# Patient Record
Sex: MALE | URBAN - METROPOLITAN AREA
[De-identification: names, ages, dates, MRNs, and addresses within clinical notes are randomized per-mention and may not be internally consistent; named-entity substitution may affect disease eponyms.]

---

## 2020-08-27 ENCOUNTER — PREP FOR PROCEDURE (OUTPATIENT)
Dept: OPHTHALMOLOGY | Facility: CLINIC | Age: 70
End: 2020-08-27

## 2020-08-27 DIAGNOSIS — H25.13 AGE-RELATED NUCLEAR CATARACT OF BOTH EYES: Primary | ICD-10-CM

## 2020-09-16 ENCOUNTER — HOSPITAL ENCOUNTER (OUTPATIENT)
Facility: AMBULATORY SURGERY CENTER | Age: 70
End: 2020-09-16
Attending: OPHTHALMOLOGY
Payer: COMMERCIAL

## 2020-09-16 DIAGNOSIS — H25.13 AGE-RELATED NUCLEAR CATARACT OF BOTH EYES: ICD-10-CM

## 2020-09-16 DIAGNOSIS — Z11.59 ENCOUNTER FOR SCREENING FOR OTHER VIRAL DISEASES: Primary | ICD-10-CM

## 2021-04-25 DIAGNOSIS — H25.13 AGE-RELATED NUCLEAR CATARACT OF BOTH EYES: Primary | ICD-10-CM

## 2021-04-27 PROBLEM — H25.13 AGE-RELATED NUCLEAR CATARACT OF BOTH EYES: Status: ACTIVE | Noted: 2020-09-16

## 2021-05-16 DIAGNOSIS — Z11.59 ENCOUNTER FOR SCREENING FOR OTHER VIRAL DISEASES: Primary | ICD-10-CM

## 2021-05-24 DIAGNOSIS — Z11.59 ENCOUNTER FOR SCREENING FOR OTHER VIRAL DISEASES: ICD-10-CM

## 2021-06-09 ENCOUNTER — OFFICE VISIT (OUTPATIENT)
Dept: OPHTHALMOLOGY | Facility: CLINIC | Age: 71
End: 2021-06-09
Attending: OPHTHALMOLOGY
Payer: COMMERCIAL

## 2021-06-09 ENCOUNTER — TELEPHONE (OUTPATIENT)
Dept: OPHTHALMOLOGY | Facility: CLINIC | Age: 71
End: 2021-06-09

## 2021-06-09 DIAGNOSIS — H25.13 AGE-RELATED NUCLEAR CATARACT OF BOTH EYES: Primary | ICD-10-CM

## 2021-06-09 DIAGNOSIS — Z11.59 ENCOUNTER FOR SCREENING FOR OTHER VIRAL DISEASES: ICD-10-CM

## 2021-06-09 LAB
LABORATORY COMMENT REPORT: NORMAL
SARS-COV-2 RNA RESP QL NAA+PROBE: NEGATIVE
SARS-COV-2 RNA RESP QL NAA+PROBE: NORMAL
SPECIMEN SOURCE: NORMAL
SPECIMEN SOURCE: NORMAL

## 2021-06-09 PROCEDURE — U0005 INFEC AGEN DETEC AMPLI PROBE: HCPCS | Mod: 90 | Performed by: PATHOLOGY

## 2021-06-09 PROCEDURE — G0463 HOSPITAL OUTPT CLINIC VISIT: HCPCS | Mod: 25

## 2021-06-09 PROCEDURE — 99204 OFFICE O/P NEW MOD 45 MIN: CPT | Mod: GC | Performed by: OPHTHALMOLOGY

## 2021-06-09 PROCEDURE — U0003 INFECTIOUS AGENT DETECTION BY NUCLEIC ACID (DNA OR RNA); SEVERE ACUTE RESPIRATORY SYNDROME CORONAVIRUS 2 (SARS-COV-2) (CORONAVIRUS DISEASE [COVID-19]), AMPLIFIED PROBE TECHNIQUE, MAKING USE OF HIGH THROUGHPUT TECHNOLOGIES AS DESCRIBED BY CMS-2020-01-R: HCPCS | Mod: 90 | Performed by: PATHOLOGY

## 2021-06-09 RX ORDER — PREDNISOLONE ACETATE 10 MG/ML
1 SUSPENSION/ DROPS OPHTHALMIC 4 TIMES DAILY
Qty: 10 ML | Refills: 0 | Status: SHIPPED | OUTPATIENT
Start: 2021-06-09

## 2021-06-09 RX ORDER — OFLOXACIN 3 MG/ML
1 SOLUTION/ DROPS OPHTHALMIC 4 TIMES DAILY
Qty: 5 ML | Refills: 0 | Status: SHIPPED | OUTPATIENT
Start: 2021-06-09

## 2021-06-09 RX ORDER — OFLOXACIN 3 MG/ML
1 SOLUTION/ DROPS OPHTHALMIC 4 TIMES DAILY
Qty: 5 ML | Refills: 0 | Status: SHIPPED | OUTPATIENT
Start: 2021-06-09 | End: 2021-06-09

## 2021-06-09 RX ORDER — FINASTERIDE 5 MG/1
5 TABLET, FILM COATED ORAL DAILY
COMMUNITY
Start: 2021-05-18

## 2021-06-09 RX ORDER — KETOROLAC TROMETHAMINE 5 MG/ML
1 SOLUTION OPHTHALMIC 4 TIMES DAILY
Qty: 5 ML | Refills: 0 | Status: SHIPPED | OUTPATIENT
Start: 2021-06-09 | End: 2021-06-09

## 2021-06-09 RX ORDER — KETOROLAC TROMETHAMINE 5 MG/ML
1 SOLUTION OPHTHALMIC 4 TIMES DAILY
Qty: 5 ML | Refills: 0 | Status: SHIPPED | OUTPATIENT
Start: 2021-06-09

## 2021-06-09 RX ORDER — PREDNISOLONE ACETATE 10 MG/ML
1 SUSPENSION/ DROPS OPHTHALMIC 4 TIMES DAILY
Qty: 10 ML | Refills: 0 | Status: SHIPPED | OUTPATIENT
Start: 2021-06-09 | End: 2021-06-09

## 2021-06-09 ASSESSMENT — REFRACTION_WEARINGRX
OD_AXIS: 008
OD_CYLINDER: +0.75
OD_ADD: +2.50
OS_ADD: +2.50
OD_SPHERE: -7.50
SPECS_TYPE: PAL
OS_AXIS: 150
OS_SPHERE: -7.50
OS_CYLINDER: +1.00

## 2021-06-09 ASSESSMENT — VISUAL ACUITY
OD_CC: 20/60
OS_PH_CC: 20/40
CORRECTION_TYPE: GLASSES
OS_PH_CC+: -2
METHOD: SNELLEN - LINEAR
OS_CC+: +1
OS_CC: 20/50

## 2021-06-09 ASSESSMENT — CONF VISUAL FIELD
OS_NORMAL: 1
OD_NORMAL: 1

## 2021-06-09 ASSESSMENT — TONOMETRY
IOP_METHOD: ICARE
OS_IOP_MMHG: 12
OD_IOP_MMHG: 11

## 2021-06-09 ASSESSMENT — SLIT LAMP EXAM - LIDS
COMMENTS: NORMAL
COMMENTS: NORMAL

## 2021-06-09 NOTE — TELEPHONE ENCOUNTER
[facility] Prior Authorization Team Request    Medication: Ketorolac 0.5% Solution  Dosing: Place one drop into the right eye 4 times daily  Qty: 5  Day Supply: 25  NDC (required for Medicaid members): [address]-06     Insurance   BIN: [id number]  PCN: [id number]  Grp: [id number]  ID: [id number]    CoverMyMeds Key (if applicable):     Additional documentation: Qty Limits PA required. Max daily dose per plan of 0.17 ml daily.      Filling Pharmacy: [facility]  Phone Number: [phone]  Contact:  Tyree Sanders NPI (required for Medicaid members): [id number]

## 2021-06-09 NOTE — PROGRESS NOTES
CC:  Here for Cataract eval each eye.    HPI:    Toño Brown 71 year old  male here for cataract evaluation. Endorses mild dryness, itching. No diplopia, floaters or flashing lights. Vision has progressively worsened and it is worse at night    POHx:  Last eye exam: 3yrs ?  Prior eye surgery/laser: NO  CTL wearer: NO  Glasses: yes progressive?)    Family Hx of eye disease: NK at this time    Gtts Currenly Taking:  PFATS BE    Allergies:  NKDA    A/P:    # Senile Nuclear Cataracts each eye  - Biometry (Immersion today)  - DAVION today  - H&P  completed today in clinc.  - On Finesteride    Visually significant cataract, both eye  - R/B/A/possible complications of cataract surgery reviewed and patient would like to proceed with CEIOL  - No trauma,on Finesteride, no PXF material  - No fluoroquinolone allergy  - Target: Near (-2.25D to -2.50D)  - Anesthesia: Mac/topical    # Dry Eye Syndrome each eye  - using PFATS x3-4/day each eye    #myopia, OU    Follow up Cornea: IN OR on 6/10/21 RE & 6/15/21 DANYELLE, Dr Brown.      Desmond Yan MD  Ophthalmology Resident, PGY-3     Attending Physician Attestation:  Complete documentation of historical and exam elements from today's encounter can be found in the full encounter summary report (not reduplicated in this progress note).  I personally obtained the chief complaint(s) and history of present illness.  I confirmed and edited as necessary the review of systems, past medical/surgical history, family history, social history, and examination findings as documented by others; and I examined the patient myself.  I personally reviewed the relevant tests, images, and reports as documented above.  I formulated and edited as necessary the assessment and plan and discussed the findings and management plan with the patient and family. - Callum Brown MD

## 2021-06-09 NOTE — TELEPHONE ENCOUNTER
PA Initiation    Medication: ketorolac (ACULAR) 0.5 % ophthalmic solution   Insurance Company: Other (see comments)  Pharmacy Filling the Rx: [facility] [place], [place] - [address] [place] [address] [phone]  Filling Pharmacy Phone: [phone]  Filling Pharmacy Fax:    Start Date: 6/9/2021

## 2021-06-09 NOTE — NURSING NOTE
Chief Complaints and History of Present Illnesses   Patient presents with     Cataract Evaluation     Chief Complaint(s) and History of Present Illness(es)     Cataract Evaluation     Laterality: both eyes    Associated symptoms: blurred vision, glare and haloes    Treatments tried: glasses and artificial tears    Pain scale: 0/10              Comments     Pt takes glasses off to read - notes blurriness that has worsened over the past year or so  Right eye CE/IOL set for 6/10/21 and left eye 6/15/21  Pt does note occasional floaters - denies flashes  Pt uses AT's at bedtime both eyes    TO Terrazas 8:12 AM June 9, 2021

## 2021-06-10 ENCOUNTER — ANESTHESIA (OUTPATIENT)
Dept: SURGERY | Facility: AMBULATORY SURGERY CENTER | Age: 71
End: 2021-06-10
Payer: COMMERCIAL

## 2021-06-10 ENCOUNTER — HOSPITAL ENCOUNTER (OUTPATIENT)
Facility: AMBULATORY SURGERY CENTER | Age: 71
End: 2021-06-10
Attending: OPHTHALMOLOGY
Payer: COMMERCIAL

## 2021-06-10 ENCOUNTER — ANESTHESIA EVENT (OUTPATIENT)
Dept: SURGERY | Facility: AMBULATORY SURGERY CENTER | Age: 71
End: 2021-06-10
Payer: COMMERCIAL

## 2021-06-10 VITALS
HEIGHT: 65 IN | HEART RATE: 64 BPM | DIASTOLIC BLOOD PRESSURE: 73 MMHG | BODY MASS INDEX: 28.66 KG/M2 | OXYGEN SATURATION: 98 % | WEIGHT: 172 LBS | SYSTOLIC BLOOD PRESSURE: 111 MMHG | RESPIRATION RATE: 16 BRPM | TEMPERATURE: 98 F

## 2021-06-10 DIAGNOSIS — H25.13 AGE-RELATED NUCLEAR CATARACT OF BOTH EYES: Primary | ICD-10-CM

## 2021-06-10 PROCEDURE — 66984 XCAPSL CTRC RMVL W/O ECP: CPT | Mod: RT

## 2021-06-10 DEVICE — EYE IMP IOL AMO PCL TECNIS ZCB00 13.5: Type: IMPLANTABLE DEVICE | Site: EYE | Status: FUNCTIONAL

## 2021-06-10 RX ORDER — FENTANYL CITRATE 50 UG/ML
INJECTION, SOLUTION INTRAMUSCULAR; INTRAVENOUS PRN
Status: DISCONTINUED | OUTPATIENT
Start: 2021-06-10 | End: 2021-06-10

## 2021-06-10 RX ORDER — SODIUM CHLORIDE, SODIUM LACTATE, POTASSIUM CHLORIDE, CALCIUM CHLORIDE 600; 310; 30; 20 MG/100ML; MG/100ML; MG/100ML; MG/100ML
INJECTION, SOLUTION INTRAVENOUS CONTINUOUS
Status: DISCONTINUED | OUTPATIENT
Start: 2021-06-10 | End: 2021-06-11 | Stop reason: HOSPADM

## 2021-06-10 RX ORDER — GLYCOPYRROLATE 0.2 MG/ML
INJECTION, SOLUTION INTRAMUSCULAR; INTRAVENOUS PRN
Status: DISCONTINUED | OUTPATIENT
Start: 2021-06-10 | End: 2021-06-10

## 2021-06-10 RX ORDER — MOXIFLOXACIN 5 MG/ML
1 SOLUTION/ DROPS OPHTHALMIC
Status: COMPLETED | OUTPATIENT
Start: 2021-06-10 | End: 2021-06-10

## 2021-06-10 RX ORDER — TETRACAINE HYDROCHLORIDE 5 MG/ML
SOLUTION OPHTHALMIC PRN
Status: DISCONTINUED | OUTPATIENT
Start: 2021-06-10 | End: 2021-06-10 | Stop reason: HOSPADM

## 2021-06-10 RX ORDER — PROPARACAINE HYDROCHLORIDE 5 MG/ML
1 SOLUTION/ DROPS OPHTHALMIC ONCE
Status: COMPLETED | OUTPATIENT
Start: 2021-06-10 | End: 2021-06-10

## 2021-06-10 RX ORDER — LIDOCAINE 40 MG/G
CREAM TOPICAL
Status: DISCONTINUED | OUTPATIENT
Start: 2021-06-10 | End: 2021-06-10 | Stop reason: HOSPADM

## 2021-06-10 RX ORDER — ONDANSETRON 2 MG/ML
INJECTION INTRAMUSCULAR; INTRAVENOUS PRN
Status: DISCONTINUED | OUTPATIENT
Start: 2021-06-10 | End: 2021-06-10

## 2021-06-10 RX ORDER — MOXIFLOXACIN IN NACL,ISO-OS/PF 0.3MG/0.3
SYRINGE (ML) INTRAOCULAR PRN
Status: DISCONTINUED | OUTPATIENT
Start: 2021-06-10 | End: 2021-06-10 | Stop reason: HOSPADM

## 2021-06-10 RX ORDER — LIDOCAINE HYDROCHLORIDE 10 MG/ML
INJECTION, SOLUTION EPIDURAL; INFILTRATION; INTRACAUDAL; PERINEURAL PRN
Status: DISCONTINUED | OUTPATIENT
Start: 2021-06-10 | End: 2021-06-10 | Stop reason: HOSPADM

## 2021-06-10 RX ORDER — PREDNISOLONE ACETATE 1 %
SUSPENSION, DROPS(FINAL DOSAGE FORM)(ML) OPHTHALMIC (EYE) PRN
Status: DISCONTINUED | OUTPATIENT
Start: 2021-06-10 | End: 2021-06-10 | Stop reason: HOSPADM

## 2021-06-10 RX ORDER — SODIUM CHLORIDE, SODIUM LACTATE, POTASSIUM CHLORIDE, CALCIUM CHLORIDE 600; 310; 30; 20 MG/100ML; MG/100ML; MG/100ML; MG/100ML
500 INJECTION, SOLUTION INTRAVENOUS CONTINUOUS
Status: DISCONTINUED | OUTPATIENT
Start: 2021-06-10 | End: 2021-06-10 | Stop reason: HOSPADM

## 2021-06-10 RX ORDER — BALANCED SALT SOLUTION 6.4; .75; .48; .3; 3.9; 1.7 MG/ML; MG/ML; MG/ML; MG/ML; MG/ML; MG/ML
SOLUTION OPHTHALMIC PRN
Status: DISCONTINUED | OUTPATIENT
Start: 2021-06-10 | End: 2021-06-10 | Stop reason: HOSPADM

## 2021-06-10 RX ORDER — SODIUM CHLORIDE, SODIUM LACTATE, POTASSIUM CHLORIDE, CALCIUM CHLORIDE 600; 310; 30; 20 MG/100ML; MG/100ML; MG/100ML; MG/100ML
INJECTION, SOLUTION INTRAVENOUS CONTINUOUS PRN
Status: DISCONTINUED | OUTPATIENT
Start: 2021-06-10 | End: 2021-06-10

## 2021-06-10 RX ORDER — CYCLOPENTOLAT/TROPIC/PHENYLEPH 1%-1%-2.5%
1 DROPS (EA) OPHTHALMIC (EYE)
Status: COMPLETED | OUTPATIENT
Start: 2021-06-10 | End: 2021-06-10

## 2021-06-10 RX ADMIN — PROPARACAINE HYDROCHLORIDE 1 DROP: 5 SOLUTION/ DROPS OPHTHALMIC at 12:18

## 2021-06-10 RX ADMIN — MOXIFLOXACIN 1 DROP: 5 SOLUTION/ DROPS OPHTHALMIC at 12:29

## 2021-06-10 RX ADMIN — FENTANYL CITRATE 25 MCG: 50 INJECTION, SOLUTION INTRAMUSCULAR; INTRAVENOUS at 13:47

## 2021-06-10 RX ADMIN — MOXIFLOXACIN 1 DROP: 5 SOLUTION/ DROPS OPHTHALMIC at 12:24

## 2021-06-10 RX ADMIN — SODIUM CHLORIDE, SODIUM LACTATE, POTASSIUM CHLORIDE, CALCIUM CHLORIDE: 600; 310; 30; 20 INJECTION, SOLUTION INTRAVENOUS at 13:43

## 2021-06-10 RX ADMIN — Medication 1 DROP: at 12:19

## 2021-06-10 RX ADMIN — FENTANYL CITRATE 25 MCG: 50 INJECTION, SOLUTION INTRAMUSCULAR; INTRAVENOUS at 13:53

## 2021-06-10 RX ADMIN — GLYCOPYRROLATE 0.2 MG: 0.2 INJECTION, SOLUTION INTRAMUSCULAR; INTRAVENOUS at 13:46

## 2021-06-10 RX ADMIN — MOXIFLOXACIN 1 DROP: 5 SOLUTION/ DROPS OPHTHALMIC at 12:19

## 2021-06-10 RX ADMIN — ONDANSETRON 4 MG: 2 INJECTION INTRAMUSCULAR; INTRAVENOUS at 13:50

## 2021-06-10 RX ADMIN — Medication 1 DROP: at 12:24

## 2021-06-10 RX ADMIN — Medication 1 DROP: at 12:29

## 2021-06-10 ASSESSMENT — MIFFLIN-ST. JEOR: SCORE: 1471.45

## 2021-06-10 NOTE — ANESTHESIA PREPROCEDURE EVALUATION
Anesthesia Pre-Procedure Evaluation    Patient: Toño Brown   MRN: [id number] : 1950        Preoperative Diagnosis: Age-related nuclear cataract of both eyes [H25.13]   Procedure : Procedure(s):  RIGHT EYE PHACOEMULSIFICATION, CATARACT, WITH INTRAOCULAR LENS IMPLANT     No past medical history on file.   Past Surgical History:   Procedure Laterality Date     ACHILLES TENDON SURGERY Right       No Known Allergies   Social History     Tobacco Use     Smoking status: Former Smoker     Quit date:      Years since quittin.4     Smokeless tobacco: Never Used   Substance Use Topics     Alcohol use: Not on file      Wt Readings from Last 1 Encounters:   06/10/21 78 kg (172 lb)        Anesthesia Evaluation            ROS/MED HX  ENT/Pulmonary:  - neg pulmonary ROS     Neurologic:  - neg neurologic ROS     Cardiovascular:  - neg cardiovascular ROS     METS/Exercise Tolerance:     Hematologic:  - neg hematologic  ROS     Musculoskeletal:  - neg musculoskeletal ROS     GI/Hepatic:  - neg GI/hepatic ROS     Renal/Genitourinary:  - neg Renal ROS   (+) BPH,     Endo:  - neg endo ROS     Psychiatric/Substance Use:  - neg psychiatric ROS     Infectious Disease:  - neg infectious disease ROS     Malignancy:  - neg malignancy ROS     Other:  - neg other ROS          Physical Exam    Airway  airway exam normal      Mallampati: II   TM distance: > 3 FB   Neck ROM: full   Mouth opening: > 3 cm    Respiratory Devices and Support         Dental           Cardiovascular          Rhythm and rate: regular and normal     Pulmonary   pulmonary exam normal        breath sounds clear to auscultation           OUTSIDE LABS:  CBC: No results found for: WBC, HGB, HCT, PLT  BMP: No results found for: NA, POTASSIUM, CHLORIDE, CO2, BUN, CR, GLC  COAGS: No results found for: PTT, INR, FIBR  POC: No results found for: BGM, HCG, HCGS  HEPATIC: No results found for: ALBUMIN, PROTTOTAL, ALT, AST, GGT, ALKPHOS, BILITOTAL, BILIDIRECT,  TAL  OTHER: No results found for: PH, LACT, A1C, ADRIANA, PHOS, MAG, LIPASE, AMYLASE, TSH, T4, T3, CRP, SED    Anesthesia Plan    ASA Status:  1   NPO Status:  NPO Appropriate    Anesthesia Type: MAC.     - Reason for MAC: straight local not clinically adequate, immobility needed   Induction: Intravenous.           Consents    Anesthesia Plan(s) and associated risks, benefits, and realistic alternatives discussed. Questions answered and patient/representative(s) expressed understanding.     - Discussed with:  Patient      - Extended Intubation/Ventilatory Support Discussed: No.      - Patient is DNR/DNI Status: No    Use of blood products discussed: No .     Postoperative Care    Pain management: IV analgesics, Oral pain medications, Multi-modal analgesia.   PONV prophylaxis: Ondansetron (or other 5HT-3)     Comments:                Cecilio Galarza MD

## 2021-06-10 NOTE — ANESTHESIA POSTPROCEDURE EVALUATION
Patient: Toño Brown    Procedure(s):  RIGHT EYE PHACOEMULSIFICATION, CATARACT, WITH INTRAOCULAR LENS IMPLANT    Diagnosis:Age-related nuclear cataract of both eyes [H25.13]  Diagnosis Additional Information: No value filed.    Anesthesia Type:  MAC    Note:  Disposition: Outpatient   Postop Pain Control: Uneventful            Sign Out: Well controlled pain   PONV: No   Neuro/Psych: Uneventful            Sign Out: Acceptable/Baseline neuro status   Airway/Respiratory: Uneventful            Sign Out: Acceptable/Baseline resp. status   CV/Hemodynamics: Uneventful            Sign Out: Acceptable CV status   Other NRE: NONE   DID A NON-ROUTINE EVENT OCCUR? No           Last vitals:  Vitals:    06/10/21 1202 06/10/21 1417 06/10/21 1431   BP: 102/78 117/75 111/73   Pulse: (!) 49 59 64   Resp: 18 16 16   Temp: 36.7  C (98  F) 36.7  C (98  F) 36.7  C (98  F)   SpO2: 96% 97% 98%       Last vitals prior to Anesthesia Care Transfer:  CRNA VITALS  6/10/2021 1343 - 6/10/2021 1443      6/10/2021             EKG:  Sinus bradycardia          Electronically Signed By: Cecilio Galarza MD  Sherly 10, 2021  3:15 PM

## 2021-06-10 NOTE — OP NOTE
Operative Report    Date of Operation: Sherly 10, 2021  Pre-operative diagnosis: Visually significant cataract, right eye   Post-operative diagnosis: Same   Procedure(s):   1. phacoemulsification of cataract and insertion of intraocular lens, right eye  Surgeon(s): Dr. Callum Brown  Findings: None   Blood Loss: None  Complications: None     Implant Name Type Inv. Item Serial No.  Lot No. LRB No. Used Action   EYE IMP IOL ANYI PCL TECNIS ZCB00 13.5 - [id number] Lens/Eye Implant EYE IMP IOL ANYI PCL TECNIS ZCB00 13.5 [id number] [employer]  Right 1 Implanted     INDICATION FOR PROCEDURE   The patient has been followed in our eye clinic with a visually-significant cataract that has been affecting their activities of daily living. The risks, including, but not limited to infection, loss of vision, loss of eye, need for more surgery, and bleeding, along with the benefits, alternatives, expectations, and the procedure itself were discussed at length with the patient who wished to proceed with surgery.   DESCRIPTION OF PROCEDURE   In the preoperative suite, the patient was identified, the surgical site marked and informed consent was obtained. The patient was the brought back to the operative suite where the appropriate anesthesia monitors were connected. A routine time-out was performed and topical lidocaine Akten gel was administered to the right eye. The patient's operative eye was then prepped and draped in the usual sterile fashion for ophthalmic surgery.  Following draping, a lid speculum was placed to the operative eye. A pair of 0.12 forceps and a sideport blade were used to make a paracentesis incision. Preservative free lidocaine was injected into the anterior chamber.  Viscoat was then injected into the anterior chamber. A 2.4 mm keratome was used to make a temporal, triplanar clear corneal incision.Capsulorrhexis was completed with a bent cystitome and Utrata forceps. Hydrodissection of the  nucleus was achieved with a hydrodissection cannula.The nucleus was found to move freely within the capsular bag. The cataract was then removed using a stop chop technique. The irrigation and aspiration handpiece was used to remove the remaining cortex. The capsular bag was filled with Provisc. The intraocular lens was injected into the capsular bag and dialed into position. The remaining viscoelastic was removed using irrigation and aspiration.  BSS on a cannula was used to hydrate the clear corneal and paracentesis incisions. Weck-radha sponges were used to confirm there were no leaks from the incisions. Intraocular pressure was assessed and found to be appropriate. Following the end of the case, the anterior chamber was noted to be deep and the lens was found to be well-centered and in the capsular bag.    The lid speculum and drapes were carefully removed. Several drops of Ofloxacin, ketorolac, and Prednisolone Acetate were placed into the operative eye. A shield was taped over the eye. The patient was then taken to the recovery room in stable condition having tolerated the procedure well and discharged home in good condition. Patient is to follow-up next day at eye clinic for post-operative visit.  Dr. Callum Brown was scrubbed and performed the entire surgery.    Callum Brown MD   of Ophthalmology

## 2021-06-10 NOTE — ANESTHESIA CARE TRANSFER NOTE
Patient: Toño Brown    Procedure(s):  RIGHT EYE PHACOEMULSIFICATION, CATARACT, WITH INTRAOCULAR LENS IMPLANT    Diagnosis: Age-related nuclear cataract of both eyes [H25.13]  Diagnosis Additional Information: No value filed.    Anesthesia Type:   MAC     Note:      Level of Consciousness: awake  Oxygen Supplementation: room air    Independent Airway: airway patency satisfactory and stable        Patient transferred to: Phase II    Handoff Report: Identifed the Patient, Identified the Reponsible Provider, Reviewed the pertinent medical history, Discussed the surgical course, Reviewed Intra-OP anesthesia mangement and issues during anesthesia, Set expectations for post-procedure period and Allowed opportunity for questions and acknowledgement of understanding      Vitals: (Last set prior to Anesthesia Care Transfer)  CRNA VITALS  6/10/2021 1343 - 6/10/2021 1418      6/10/2021             EKG:  Sinus bradycardia        Electronically Signed By: DEEPIKA Marion CRNA  Sherly 10, 2021  2:18 PM

## 2021-06-10 NOTE — DISCHARGE INSTRUCTIONS
"[facility]  Home Care Following Anesthesia  For 24 hours after surgery:  1. Get plenty of rest.  A responsible adult must stay with you for at least 24 hours after you leave the surgery center.  2. Do not drive or use heavy equipment.  If you have weakness or tingling, don't drive or use heavy equipment until this feeling goes away.   3. Do not drink alcohol.   4. Avoid strenuous or risky activities.  Ask for help when climbing stairs.  5. You may feel lightheaded.  IF so, sit for a few minutes before standing.  Have someone help you get up.   6. If you have nausea (feel sick to your stomach): Drink only clear liquids such as apple juice, ginger ale, broth or 7-Up.  Rest may also help.  Be sure to drink enough fluids.  Move to a regular diet as you feel able.   7. You may have a slight fever.  Call the doctor if your fever is over 100 F (37.7 C) (taken under the tongue) or lasts longer than 24 hours.  8. You may have a dry mouth, a sore throat, muscle aches or trouble sleeping. These should go away after 24 hours.  9. Do not make important or legal decisions.   10. It is recommended to avoid smoking.        Today you received a Marcaine or bupivacaine block to numb the nerves near your surgery site.  This is a block using local anesthetic or \"numbing\" medication injected around the nerves to anesthetize or \"numb\" the area supplied by those nerves.  This block is injected into the muscle layer near your surgical site.  The medication may numb the location where you had surgery for 6-18 hours, but may last up to 24 hours.  If your surgical site is an arm or leg you should be careful with your affected limb, since it is possible to injure your limb without being aware of it due to the numbing.  Until full feeling returns, you should guard against bumping or hitting your limb, and avoid extreme hot or cold temperatures on the skin.  As the block wears off, the feeling will return as " a tingling or prickly sensation near your surgical site.  You will experience more discomfort from your incision as the feeling returns.  You may want to take a pain pill (a narcotic or Tylenol if this was prescribed by your surgeon) when you start to experience mild pain before the pain beccomes more severe.  If your pain medications do not control your pain you should notifiy your surgeon.    Tips for taking pain medications  To get the best pain relief possible, remember these points:    Take pain medications as directed, before pain becomes severe.    Pain medication can upset your stomach: taking it with food may help.    Constipation is a common side effect of pain medication. Drink plenty of  fluids.    Eat foods high in fiber. Take a stool softener if recommended by your doctor or pharmacist.    Do not drink alcohol, drive or operate machinery while taking pain medications.    Ask about other ways to control pain, such as with heat, ice or relaxation.    Tylenol/Acetaminophen Consumption  To help encourage the safe use of acetaminophen, the makers of TYLENOL  have lowered the maximum daily dose for single-ingredient Extra Strength TYLENOL  (acetaminophen) products sold in the [place] from 8 pills per day (4,000 mg) to 6 pills per day (3,000 mg). The dosing interval has also changed from 2 pills every 4-6 hours to 2 pills every 6 hours.    If you feel your pain relief is insufficient, you may take Tylenol/Acetaminophen in addition to your narcotic pain medication.     Be careful not to exceed 3,000 mg of Tylenol/Acetaminophen in a 24 hour period from all sources.    If you are taking extra strength Tylenol/acetaminophen (500 mg), the maximum dose is 6 tablets in 24 hours.    If you are taking regular strength acetaminophen (325 mg), the maximum dose is 9 tablets in 24 hours.    Call a doctor for any of the followin. Signs of infection (fever, growing tenderness at the surgery site, a large amount of  drainage or bleeding, severe pain, foul-smelling drainage, redness, swelling).  2. It has been over 8 to 10 hours since surgery and you are still not able to urinate (pass water).  3. Headache for over 24 hours.  4. Signs of Covid-19 infection (temperature over 100 degrees, shortness of breath, cough, loss of taste/smell, generalized body aches, persistent headache, chills, sore throat, nausea/vomiting/diarrhea)  Your doctor is:  Dr. Callum Brown, Ophthalmology: [phone]                  Or dial [phone] and ask for the resident on call for:  Ophthalmology  For emergency care, call the:  [facility]:  [phone] (TTY for hearing impaired: [phone])

## 2021-06-11 NOTE — TELEPHONE ENCOUNTER
Contacted insurance plan to follow up on PA request.  Per insurance rep case is still under review and turn around time is 3-5 business days.

## 2021-06-11 NOTE — TELEPHONE ENCOUNTER
Prior Authorization Approval    Authorization Effective Date: 6/11/2021  Authorization Expiration Date: 6/11/2022  Medication: ketorolac (ACULAR) 0.5 % ophthalmic solution--APPROVED  Approved Dose/Quantity:   Reference #:     Insurance Company: Other (see comments)  Expected CoPay:       CoPay Card Available:      Foundation Assistance Needed:    Which Pharmacy is filling the prescription (Not needed for infusion/clinic administered): [facility] [address]  Pharmacy Notified: Yes  Patient Notified: Yes **Instructed pharmacy to notify patient when script is ready to /ship.**

## 2021-06-13 DIAGNOSIS — H25.13 AGE-RELATED NUCLEAR CATARACT OF BOTH EYES: Primary | ICD-10-CM

## 2021-06-13 RX ORDER — KETOROLAC TROMETHAMINE 5 MG/ML
1 SOLUTION OPHTHALMIC 4 TIMES DAILY
Qty: 5 ML | Refills: 0 | Status: SHIPPED | OUTPATIENT
Start: 2021-06-13

## 2021-06-13 RX ORDER — PREDNISOLONE ACETATE 10 MG/ML
1 SUSPENSION/ DROPS OPHTHALMIC 4 TIMES DAILY
Qty: 5 ML | Refills: 0 | Status: SHIPPED | OUTPATIENT
Start: 2021-06-13

## 2021-06-13 RX ORDER — OFLOXACIN 3 MG/ML
1 SOLUTION/ DROPS OPHTHALMIC 4 TIMES DAILY
Qty: 5 ML | Refills: 0 | Status: SHIPPED | OUTPATIENT
Start: 2021-06-13

## 2021-06-14 ENCOUNTER — ANESTHESIA EVENT (OUTPATIENT)
Dept: SURGERY | Facility: AMBULATORY SURGERY CENTER | Age: 71
End: 2021-06-14
Payer: COMMERCIAL

## 2021-06-14 DIAGNOSIS — Z11.59 ENCOUNTER FOR SCREENING FOR OTHER VIRAL DISEASES: ICD-10-CM

## 2021-06-14 PROCEDURE — U0003 INFECTIOUS AGENT DETECTION BY NUCLEIC ACID (DNA OR RNA); SEVERE ACUTE RESPIRATORY SYNDROME CORONAVIRUS 2 (SARS-COV-2) (CORONAVIRUS DISEASE [COVID-19]), AMPLIFIED PROBE TECHNIQUE, MAKING USE OF HIGH THROUGHPUT TECHNOLOGIES AS DESCRIBED BY CMS-2020-01-R: HCPCS | Mod: 90 | Performed by: PATHOLOGY

## 2021-06-14 PROCEDURE — U0005 INFEC AGEN DETEC AMPLI PROBE: HCPCS | Mod: 90 | Performed by: PATHOLOGY

## 2021-06-15 ENCOUNTER — ANESTHESIA (OUTPATIENT)
Dept: SURGERY | Facility: AMBULATORY SURGERY CENTER | Age: 71
End: 2021-06-15
Payer: COMMERCIAL

## 2021-06-15 ENCOUNTER — HOSPITAL ENCOUNTER (OUTPATIENT)
Facility: AMBULATORY SURGERY CENTER | Age: 71
End: 2021-06-15
Attending: OPHTHALMOLOGY
Payer: COMMERCIAL

## 2021-06-15 VITALS
BODY MASS INDEX: 28.66 KG/M2 | DIASTOLIC BLOOD PRESSURE: 46 MMHG | SYSTOLIC BLOOD PRESSURE: 106 MMHG | TEMPERATURE: 97 F | HEART RATE: 46 BPM | HEIGHT: 65 IN | RESPIRATION RATE: 16 BRPM | OXYGEN SATURATION: 95 % | WEIGHT: 172 LBS

## 2021-06-15 DIAGNOSIS — H25.13 AGE-RELATED NUCLEAR CATARACT OF BOTH EYES: Primary | ICD-10-CM

## 2021-06-15 PROCEDURE — 66984 XCAPSL CTRC RMVL W/O ECP: CPT | Mod: LT

## 2021-06-15 DEVICE — EYE IMP IOL AMO PCL TECNIS ZCB00 13.0: Type: IMPLANTABLE DEVICE | Site: EYE | Status: FUNCTIONAL

## 2021-06-15 RX ORDER — SODIUM CHLORIDE, SODIUM LACTATE, POTASSIUM CHLORIDE, CALCIUM CHLORIDE 600; 310; 30; 20 MG/100ML; MG/100ML; MG/100ML; MG/100ML
INJECTION, SOLUTION INTRAVENOUS CONTINUOUS
Status: DISCONTINUED | OUTPATIENT
Start: 2021-06-15 | End: 2021-06-16 | Stop reason: HOSPADM

## 2021-06-15 RX ORDER — ONDANSETRON 4 MG/1
4 TABLET, ORALLY DISINTEGRATING ORAL EVERY 30 MIN PRN
Status: DISCONTINUED | OUTPATIENT
Start: 2021-06-15 | End: 2021-06-16 | Stop reason: HOSPADM

## 2021-06-15 RX ORDER — PREDNISOLONE ACETATE 1 %
SUSPENSION, DROPS(FINAL DOSAGE FORM)(ML) OPHTHALMIC (EYE) PRN
Status: DISCONTINUED | OUTPATIENT
Start: 2021-06-15 | End: 2021-06-15 | Stop reason: HOSPADM

## 2021-06-15 RX ORDER — LIDOCAINE 40 MG/G
CREAM TOPICAL
Status: DISCONTINUED | OUTPATIENT
Start: 2021-06-15 | End: 2021-06-15 | Stop reason: HOSPADM

## 2021-06-15 RX ORDER — NALOXONE HYDROCHLORIDE 0.4 MG/ML
0.4 INJECTION, SOLUTION INTRAMUSCULAR; INTRAVENOUS; SUBCUTANEOUS
Status: DISCONTINUED | OUTPATIENT
Start: 2021-06-15 | End: 2021-06-16 | Stop reason: HOSPADM

## 2021-06-15 RX ORDER — ONDANSETRON 2 MG/ML
4 INJECTION INTRAMUSCULAR; INTRAVENOUS EVERY 30 MIN PRN
Status: DISCONTINUED | OUTPATIENT
Start: 2021-06-15 | End: 2021-06-16 | Stop reason: HOSPADM

## 2021-06-15 RX ORDER — MOXIFLOXACIN 5 MG/ML
1 SOLUTION/ DROPS OPHTHALMIC
Status: COMPLETED | OUTPATIENT
Start: 2021-06-15 | End: 2021-06-15

## 2021-06-15 RX ORDER — LIDOCAINE HYDROCHLORIDE 10 MG/ML
INJECTION, SOLUTION EPIDURAL; INFILTRATION; INTRACAUDAL; PERINEURAL PRN
Status: DISCONTINUED | OUTPATIENT
Start: 2021-06-15 | End: 2021-06-15 | Stop reason: HOSPADM

## 2021-06-15 RX ORDER — SODIUM CHLORIDE, SODIUM LACTATE, POTASSIUM CHLORIDE, CALCIUM CHLORIDE 600; 310; 30; 20 MG/100ML; MG/100ML; MG/100ML; MG/100ML
500 INJECTION, SOLUTION INTRAVENOUS CONTINUOUS
Status: DISCONTINUED | OUTPATIENT
Start: 2021-06-15 | End: 2021-06-15 | Stop reason: HOSPADM

## 2021-06-15 RX ORDER — TETRACAINE HYDROCHLORIDE 5 MG/ML
SOLUTION OPHTHALMIC PRN
Status: DISCONTINUED | OUTPATIENT
Start: 2021-06-15 | End: 2021-06-15 | Stop reason: HOSPADM

## 2021-06-15 RX ORDER — FENTANYL CITRATE 50 UG/ML
25-50 INJECTION, SOLUTION INTRAMUSCULAR; INTRAVENOUS
Status: DISCONTINUED | OUTPATIENT
Start: 2021-06-15 | End: 2021-06-15 | Stop reason: HOSPADM

## 2021-06-15 RX ORDER — MEPERIDINE HYDROCHLORIDE 25 MG/ML
12.5 INJECTION INTRAMUSCULAR; INTRAVENOUS; SUBCUTANEOUS
Status: DISCONTINUED | OUTPATIENT
Start: 2021-06-15 | End: 2021-06-16 | Stop reason: HOSPADM

## 2021-06-15 RX ORDER — CYCLOPENTOLAT/TROPIC/PHENYLEPH 1%-1%-2.5%
1 DROPS (EA) OPHTHALMIC (EYE)
Status: COMPLETED | OUTPATIENT
Start: 2021-06-15 | End: 2021-06-15

## 2021-06-15 RX ORDER — NALOXONE HYDROCHLORIDE 0.4 MG/ML
0.2 INJECTION, SOLUTION INTRAMUSCULAR; INTRAVENOUS; SUBCUTANEOUS
Status: DISCONTINUED | OUTPATIENT
Start: 2021-06-15 | End: 2021-06-16 | Stop reason: HOSPADM

## 2021-06-15 RX ORDER — OXYCODONE HYDROCHLORIDE 5 MG/1
5 TABLET ORAL EVERY 4 HOURS PRN
Status: DISCONTINUED | OUTPATIENT
Start: 2021-06-15 | End: 2021-06-16 | Stop reason: HOSPADM

## 2021-06-15 RX ORDER — FENTANYL CITRATE 50 UG/ML
INJECTION, SOLUTION INTRAMUSCULAR; INTRAVENOUS PRN
Status: DISCONTINUED | OUTPATIENT
Start: 2021-06-15 | End: 2021-06-15

## 2021-06-15 RX ORDER — ONDANSETRON 2 MG/ML
INJECTION INTRAMUSCULAR; INTRAVENOUS PRN
Status: DISCONTINUED | OUTPATIENT
Start: 2021-06-15 | End: 2021-06-15

## 2021-06-15 RX ORDER — MOXIFLOXACIN IN NACL,ISO-OS/PF 0.3MG/0.3
SYRINGE (ML) INTRAOCULAR PRN
Status: DISCONTINUED | OUTPATIENT
Start: 2021-06-15 | End: 2021-06-15 | Stop reason: HOSPADM

## 2021-06-15 RX ORDER — PROPARACAINE HYDROCHLORIDE 5 MG/ML
1 SOLUTION/ DROPS OPHTHALMIC ONCE
Status: COMPLETED | OUTPATIENT
Start: 2021-06-15 | End: 2021-06-15

## 2021-06-15 RX ORDER — BALANCED SALT SOLUTION 6.4; .75; .48; .3; 3.9; 1.7 MG/ML; MG/ML; MG/ML; MG/ML; MG/ML; MG/ML
SOLUTION OPHTHALMIC PRN
Status: DISCONTINUED | OUTPATIENT
Start: 2021-06-15 | End: 2021-06-15 | Stop reason: HOSPADM

## 2021-06-15 RX ADMIN — Medication 1 DROP: at 10:59

## 2021-06-15 RX ADMIN — MOXIFLOXACIN 1 DROP: 5 SOLUTION/ DROPS OPHTHALMIC at 10:47

## 2021-06-15 RX ADMIN — MOXIFLOXACIN 1 DROP: 5 SOLUTION/ DROPS OPHTHALMIC at 10:59

## 2021-06-15 RX ADMIN — ONDANSETRON 4 MG: 2 INJECTION INTRAMUSCULAR; INTRAVENOUS at 13:06

## 2021-06-15 RX ADMIN — FENTANYL CITRATE 75 MCG: 50 INJECTION, SOLUTION INTRAMUSCULAR; INTRAVENOUS at 13:03

## 2021-06-15 RX ADMIN — MOXIFLOXACIN 1 DROP: 5 SOLUTION/ DROPS OPHTHALMIC at 10:52

## 2021-06-15 RX ADMIN — PROPARACAINE HYDROCHLORIDE 1 DROP: 5 SOLUTION/ DROPS OPHTHALMIC at 10:46

## 2021-06-15 RX ADMIN — SODIUM CHLORIDE, SODIUM LACTATE, POTASSIUM CHLORIDE, CALCIUM CHLORIDE 500 ML: 600; 310; 30; 20 INJECTION, SOLUTION INTRAVENOUS at 11:00

## 2021-06-15 RX ADMIN — Medication 1 DROP: at 10:47

## 2021-06-15 RX ADMIN — Medication 1 DROP: at 10:53

## 2021-06-15 SDOH — HEALTH STABILITY: MENTAL HEALTH: HOW OFTEN DO YOU HAVE A DRINK CONTAINING ALCOHOL?: NEVER

## 2021-06-15 ASSESSMENT — ENCOUNTER SYMPTOMS: ORTHOPNEA: 0

## 2021-06-15 ASSESSMENT — MIFFLIN-ST. JEOR: SCORE: 1471.45

## 2021-06-15 ASSESSMENT — LIFESTYLE VARIABLES: TOBACCO_USE: 1

## 2021-06-15 NOTE — DISCHARGE INSTRUCTIONS
[facility]  Home Care Following Anesthesia  For 24 hours after surgery:  1. Get plenty of rest.  A responsible adult must stay with you for at least 24 hours after you leave the surgery center.  2. Do not drive or use heavy equipment.  If you have weakness or tingling, don't drive or use heavy equipment until this feeling goes away.   3. Do not drink alcohol.   4. Avoid strenuous or risky activities.  Ask for help when climbing stairs.  5. You may feel lightheaded.  IF so, sit for a few minutes before standing.  Have someone help you get up.   6. If you have nausea (feel sick to your stomach): Drink only clear liquids such as apple juice, ginger ale, broth or 7-Up.  Rest may also help.  Be sure to drink enough fluids.  Move to a regular diet as you feel able.   7. You may have a slight fever.  Call the doctor if your fever is over 100 F (37.7 C) (taken under the tongue) or lasts longer than 24 hours.  8. You may have a dry mouth, a sore throat, muscle aches or trouble sleeping. These should go away after 24 hours.  9. Do not make important or legal decisions.   10. It is recommended to avoid smoking.               Tips for taking pain medications  To get the best pain relief possible, remember these points:    Take pain medications as directed, before pain becomes severe.    Pain medication can upset your stomach: taking it with food may help.    Constipation is a common side effect of pain medication. Drink plenty of  fluids.    Eat foods high in fiber. Take a stool softener if recommended by your doctor or pharmacist.    Do not drink alcohol, drive or operate machinery while taking pain medications.    Ask about other ways to control pain, such as with heat, ice or relaxation.    Tylenol/Acetaminophen Consumption  To help encourage the safe use of acetaminophen, the makers of TYLENOL  have lowered the maximum daily dose for single-ingredient Extra Strength TYLENOL   (acetaminophen) products sold in the [place] from 8 pills per day (4,000 mg) to 6 pills per day (3,000 mg). The dosing interval has also changed from 2 pills every 4-6 hours to 2 pills every 6 hours.    If you feel your pain relief is insufficient, you may take Tylenol/Acetaminophen in addition to your narcotic pain medication.     Be careful not to exceed 3,000 mg of Tylenol/Acetaminophen in a 24 hour period from all sources.    If you are taking extra strength Tylenol/acetaminophen (500 mg), the maximum dose is 6 tablets in 24 hours.    If you are taking regular strength acetaminophen (325 mg), the maximum dose is 9 tablets in 24 hours.    Call a doctor for any of the followin. Signs of infection (fever, growing tenderness at the surgery site, a large amount of drainage or bleeding, severe pain, foul-smelling drainage, redness, swelling).  2. It has been over 8 to 10 hours since surgery and you are still not able to urinate (pass water).  3. Headache for over 24 hours.  4. Numbness, tingling or weakness the day after surgery (if you had spinal anesthesia).  5. Signs of Covid-19 infection (temperature over 100 degrees, shortness of breath, cough, loss of taste/smell, generalized body aches, persistent headache, chills, sore throat, nausea/vomiting/diarrhea)  Your doctor is:  Dr. Callum Brown, Ophthalmology: [phone]                    Or dial [phone] and ask for the resident on call for:  Ophthalmology  For emergency care, call the:  [facility]:  [phone] (TTY for hearing impaired: [phone])

## 2021-06-15 NOTE — ANESTHESIA PREPROCEDURE EVALUATION
Anesthesia Pre-Procedure Evaluation    Patient: Toño Brown   MRN: [id number] : 1950        Preoperative Diagnosis: Age-related nuclear cataract of both eyes [H25.13]   Procedure : Procedure(s):  PHACOEMULSIFICATION, CATARACT, WITH INTRAOCULAR LENS IMPLANT - LEFT EYE     History reviewed. No pertinent past medical history.   Past Surgical History:   Procedure Laterality Date     ACHILLES TENDON SURGERY Right      PHACOEMULSIFICATION CLEAR CORNEA WITH STANDARD INTRAOCULAR LENS IMPLANT Right 6/10/2021    Procedure: RIGHT EYE PHACOEMULSIFICATION, CATARACT, WITH INTRAOCULAR LENS IMPLANT;  Surgeon: Callum Brown MD;  Location: [facility] [place] OR      No Known Allergies   Social History     Tobacco Use     Smoking status: Former Smoker     Quit date:      Years since quittin.4     Smokeless tobacco: Never Used   Substance Use Topics     Alcohol use: Never     Frequency: Never      Wt Readings from Last 1 Encounters:   06/15/21 78 kg (172 lb)        Anesthesia Evaluation   Pt has had prior anesthetic. Type: MAC and General.    No history of anesthetic complications       ROS/MED HX  ENT/Pulmonary:     (+) tobacco use (quit ), Past use,  (-) asthma and recent URI   Neurologic:       Cardiovascular:    (-) [facility], orthopnea/PND and syncope   METS/Exercise Tolerance: >4 METS    Hematologic:       Musculoskeletal:       GI/Hepatic:    (-) GERD   Renal/Genitourinary:     (+) BPH,     Endo:       Psychiatric/Substance Use:       Infectious Disease:       Malignancy:       Other:            Physical Exam    Airway        Mallampati: II   TM distance: > 3 FB   Neck ROM: full   Mouth opening: > 3 cm    Respiratory Devices and Support         Dental  no notable dental history         Cardiovascular          Rhythm and rate: regular and normal     Pulmonary           breath sounds clear to auscultation           OUTSIDE LABS:  CBC: No results found for: WBC, HGB, HCT, PLT  BMP: No results found for: NA, POTASSIUM,  CHLORIDE, CO2, BUN, CR, GLC  COAGS: No results found for: PTT, INR, FIBR  POC: No results found for: BGM, HCG, HCGS  HEPATIC: No results found for: ALBUMIN, PROTTOTAL, ALT, AST, GGT, ALKPHOS, BILITOTAL, BILIDIRECT, TAL  OTHER: No results found for: PH, LACT, A1C, ADRIANA, PHOS, MAG, LIPASE, AMYLASE, TSH, T4, T3, CRP, SED    Anesthesia Plan    ASA Status:  1   NPO Status:  NPO Appropriate    Anesthesia Type: MAC.     - Reason for MAC: straight local not clinically adequate              Consents    Anesthesia Plan(s) and associated risks, benefits, and realistic alternatives discussed. Questions answered and patient/representative(s) expressed understanding.     - Discussed with:  Patient         Postoperative Care    Pain management: IV analgesics, Oral pain medications.        Comments:    Discussed plan for MAC, including possibility of awareness, risk of aspiration pneumonia. Discussed backup plan of general anesthesia and risks of general anesthesia, including sore throat/hoarse voice, abrasions/damage to lips/tongue/teeth, nausea, rare complications (including medication reactions, cardiac, pulmonary).  Discussed light sedation, likelihood of awareness/recall, for cataract surgery. Patient tolerated this well last time.              Tina Patricio MD

## 2021-06-15 NOTE — ANESTHESIA CARE TRANSFER NOTE
Patient: Toño Brown    Procedure(s):  PHACOEMULSIFICATION, CATARACT, WITH INTRAOCULAR LENS IMPLANT - LEFT EYE    Diagnosis: Age-related nuclear cataract of both eyes [H25.13]  Diagnosis Additional Information: No value filed.    Anesthesia Type:   MAC     Note:    Oropharynx: spontaneously breathing  Level of Consciousness: awake  Oxygen Supplementation: room air    Independent Airway: airway patency satisfactory and stable  Dentition: dentition unchanged  Vital Signs Stable: post-procedure vital signs reviewed and stable  Report to RN Given: handoff report given  Patient transferred to: Phase II    Handoff Report: Identifed the Patient, Identified the Reponsible Provider, Reviewed the pertinent medical history, Discussed the surgical course, Reviewed Intra-OP anesthesia mangement and issues during anesthesia, Set expectations for post-procedure period and Allowed opportunity for questions and acknowledgement of understanding      Vitals: (Last set prior to Anesthesia Care Transfer)  CRNA VITALS  6/15/2021 1254 - 6/15/2021 1329      6/15/2021             Pulse:  (!) 49    Ht Rate:  (!) 49    SpO2:  98 %    Resp Rate (set):  10        Electronically Signed By: DEEPIKA Nguyen CRNA  Sherly 15, 2021  1:29 PM

## 2021-06-15 NOTE — ANESTHESIA POSTPROCEDURE EVALUATION
Patient: Toño Brown    Procedure(s):  PHACOEMULSIFICATION, CATARACT, WITH INTRAOCULAR LENS IMPLANT - LEFT EYE    Diagnosis:Age-related nuclear cataract of both eyes [H25.13]  Diagnosis Additional Information: No value filed.    Anesthesia Type:  MAC    Note:  Disposition: Outpatient   Postop Pain Control: Uneventful            Sign Out: Well controlled pain   PONV: No   Neuro/Psych: Uneventful            Sign Out: Acceptable/Baseline neuro status   Airway/Respiratory: Uneventful            Sign Out: Acceptable/Baseline resp. status   CV/Hemodynamics: Uneventful            Sign Out: Acceptable CV status; No obvious hypovolemia; No obvious fluid overload   Other NRE: NONE   DID A NON-ROUTINE EVENT OCCUR? No           Last vitals:  Vitals:    06/15/21 1025 06/15/21 1329 06/15/21 1335   BP: 115/73 115/55 106/46   Pulse: (!) 48 50 (!) 46   Resp: 18 18 16   Temp: 35.8  C (96.4  F) 36.1  C (97  F) 36.1  C (97  F)   SpO2: 98% 98% 95%       Last vitals prior to Anesthesia Care Transfer:  CRNA VITALS  6/15/2021 1254 - 6/15/2021 1354      6/15/2021             Pulse:  (!) 49    Ht Rate:  (!) 49    SpO2:  98 %    Resp Rate (set):  10          Electronically Signed By: Tina Patricio MD  Sherly 15, 2021  3:53 PM

## 2021-06-15 NOTE — OP NOTE
Operative Report    Date of Operation: Sherly 15, 2021  Pre-operative diagnosis: Visually significant cataract, left eye   Post-operative diagnosis: Same   Procedure(s):   1. Cataract extraction by phacoemulsification with insertion of intraocular lens, left eye  Surgeon(s): Dr. Callum Brown  Findings: None   Blood Loss: None  Complications: None     Implant Name Type Inv. Item Serial No.  Lot No. LRB No. Used Action   EYE IMP IOL ANYI PCL TECNIS ZCB00 13.0 - [id number] Lens/Eye Implant EYE IMP IOL ANYI PCL TECNIS ZCB00 13.0 [id number] [employer]  Left 1 Implanted       INDICATION FOR PROCEDURE   The patient has been followed in our eye clinic with a visually-significant cataract that has been affecting their activities of daily living. The risks, including, but not limited to infection, loss of vision, loss of eye, need for more surgery, and bleeding, along with the benefits, alternatives, expectations, and the procedure itself were discussed at length with the patient who wished to proceed with surgery.   DESCRIPTION OF PROCEDURE   In the preoperative suite, the patient was identified, the surgical site marked and informed consent was obtained. The patient was the brought back to the operative suite where the appropriate anesthesia monitors were connected. A routine time-out was performed and topical lidocaine Akten gel was administered to the left eye. The patient's operative eye was then prepped and draped in the usual sterile fashion for ophthalmic surgery.  Following draping, a lid speculum was placed to the operative eye. A pair of 0.12 forceps and a sideport blade were used to make a paracentesis incision. Preservative free lidocaine was injected into the anterior chamber.   Viscoat was then injected into the anterior chamber to fill the eye. A 2.4 mm keratome was used to make a temporal, triplanar clear corneal incision.Capsulorrhexis was completed with a bent cystitome and Utrata  forceps. Hydrodissection of the nucleus was achieved with a hydrodissection cannula.The nucleus was found to move freely within the capsular bag. The cataract was then removed using a stop chop technique. The irrigation and aspiration handpiece was used to remove the remaining cortex. The capsular bag was filled with Provisc. The intraocular lens was injected into the capsular bag and dialed into position. The remaining viscoelastic was removed using irrigation and aspiration.  BSS on a cannula was used to hydrate the clear corneal and paracentesis incisions. Weck-radha sponges were used to confirm there were no leaks from the incisions. Intraocular pressure was assessed and found to be appropriate. Following the end of the case, the anterior chamber was noted to be deep and the lens was found to be well-centered and in the capsular bag.    The lid speculum and drapes were carefully removed. Several drops of Ofloxacin, ketorolac, and Prednisolone Acetate were placed into the operative eye. A shield was taped over the eye. The patient was then taken to the recovery room in stable condition having tolerated the procedure well and discharged home in good condition. Patient is to follow-up next day at eye clinic for post-operative visit.  Dr. Callum Brown was scrubbed and performed the entire surgery.

## (undated) DEVICE — EYE PACK CUSTOM ANTERIOR 30DEG TIP CENTURION PPK6682-04

## (undated) DEVICE — PACK CATARACT CUSTOM ASC SEY15CPUMC

## (undated) DEVICE — GLOVE PROTEXIS MICRO 6.5  2D73PM65

## (undated) DEVICE — SOL WATER IRRIG 500ML BOTTLE 2F7113

## (undated) DEVICE — EYE TIP IRRIGATION & ASPIRATION POLYMER 35D BENT 8065751511

## (undated) DEVICE — EYE KNIFE SLIT XSTAR VISITEC 2.4MM 45DEG BEVEL UP 373724

## (undated) DEVICE — LINEN TOWEL PACK X5 5464

## (undated) DEVICE — EYE SHIELD PLASTIC

## (undated) DEVICE — EYE KNIFE STILETTO VISITEC 1.1MM ANG 45DEG SIDEPORT 376620

## (undated) DEVICE — EYE CANN IRR 25GA CYSTOTOME 581610

## (undated) DEVICE — EYE CANN IRR 30GA  ANTERIOR CHAMBER 581273

## (undated) RX ORDER — FENTANYL CITRATE 50 UG/ML
INJECTION, SOLUTION INTRAMUSCULAR; INTRAVENOUS
Status: DISPENSED
Start: 2021-06-10

## (undated) RX ORDER — LIDOCAINE HYDROCHLORIDE 20 MG/ML
INJECTION, SOLUTION EPIDURAL; INFILTRATION; INTRACAUDAL; PERINEURAL
Status: DISPENSED
Start: 2021-06-10

## (undated) RX ORDER — FENTANYL CITRATE-0.9 % NACL/PF 10 MCG/ML
PLASTIC BAG, INJECTION (ML) INTRAVENOUS
Status: DISPENSED
Start: 2021-06-10

## (undated) RX ORDER — FENTANYL CITRATE 50 UG/ML
INJECTION, SOLUTION INTRAMUSCULAR; INTRAVENOUS
Status: DISPENSED
Start: 2021-06-15

## (undated) RX ORDER — GLYCOPYRROLATE 0.2 MG/ML
INJECTION INTRAMUSCULAR; INTRAVENOUS
Status: DISPENSED
Start: 2021-06-10

## (undated) RX ORDER — ATROPINE SULFATE 0.4 MG/ML
AMPUL (ML) INJECTION
Status: DISPENSED
Start: 2021-06-10

## (undated) RX ORDER — ONDANSETRON 2 MG/ML
INJECTION INTRAMUSCULAR; INTRAVENOUS
Status: DISPENSED
Start: 2021-06-15

## (undated) RX ORDER — PROPOFOL 10 MG/ML
INJECTION, EMULSION INTRAVENOUS
Status: DISPENSED
Start: 2021-06-10

## (undated) RX ORDER — DEXAMETHASONE SODIUM PHOSPHATE 4 MG/ML
INJECTION, SOLUTION INTRA-ARTICULAR; INTRALESIONAL; INTRAMUSCULAR; INTRAVENOUS; SOFT TISSUE
Status: DISPENSED
Start: 2021-06-10

## (undated) RX ORDER — ONDANSETRON 2 MG/ML
INJECTION INTRAMUSCULAR; INTRAVENOUS
Status: DISPENSED
Start: 2021-06-10